# Patient Record
(demographics unavailable — no encounter records)

---

## 2024-10-25 NOTE — HISTORY OF PRESENT ILLNESS
[de-identified] : Mr. 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  \par  63 year old man with no significant medical history started to feel dyspnea on exertion and palpations in 8/2016.  He went to [facility] for evaluation and labs showed wbc count of 197 with 60% blasts, Hgb 6.7 and plt 41.  He was transfused and transferred to [facility] for further treatment.  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  FISH positive for BCR/ABL1 in 92% cells and cytogenetics showed abnormal male karyotype showed t(7:16) and t(9;22).\par  He was started on CALGB 99632 protocol with upfront Dasatinib 140 mg and Dexamethasone induction.  CSF sampling on 9/16/16 was negative for malignant cells and IT MTX given.  Pt was seen by psych during induction due to anxiety associated new diagnosis.  \par  Day 15 (course 1) BM biopsy on 9/15/16 showed 0.5% leukemic cells.  FISH was pos for  BCR-ABL 57% of cells and PCR bcr/abl was negative.  CyG c/w 46 XY t(9;22) in 3 cells.  Dasatinib 140 mg held on 9/22/16 due to cytopenias and restarted at 100 mg (course II) on 10/4/16 upon count recovery.  \par  Had BM biopsy done on 10/27/16 which showed morphologic remission with normal karyotype.  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 ...brothers are haplo\par  \par  \par   [de-identified] : Final Diagnosis\par  1, 2. Bone marrow biopsy and bone marrow aspirate\par  -B-lymphoblastic leukemia/lymphoma.\par  \par  See note and description.\par  \par  Diagnostic note:\par  Suggest correlation with clinical and cytogenetic findings.\par  \par  Comprehensive report with results of pending ancillary studies to follow.\par  \par  Ancillary studies\par  Flow cytometry of bone marrow aspirate shows lymphoblasts (84% of cells), positive for HLA-DR, CD38, , CD34, CD19, CD10, CD22, partial CD20, CD13, CD33, CD11b (minimal); negative , CD15.\par  FISH: Positive for BCR-ABL1 rearrangement in 94.5% of cells (peripheral blood).\par  \par  Microscopic description:\par  1. Biopsy: Sections of clot and biopsy show hypercellularity (95%) with diffuse immature infiltrate, diminished normal trilineage hematopoiesis, rare megakaryocytes, and presence of iron stores.\par  2. Aspirate: Particulate and cellular aspirate smear with predominantly blasts (73%), scattered lymphocytes, rare mature myeloid cells, and nearly absent erythroid cells and megakaryocytes.\par  \par  Bone Marrow Aspirate Differential: (200 Cells).\par  Type % Normal*\par  Blast 73% 0-3\par  Neutrophil and\par  Precursors 4% 47-74\par  Eosinophil 0% 1-3\par  Basophil 0% 0-1\par  Pronormoblast 0% 0-2\par  Normoblast 0% 15-25\par  Monocyte 0% 0-1\par  Lymphocyte 23% 5-15\par  Plasma cell 0% 0-2\par  *Adult Range\par  \par  Comment\par  Iron stain (examined to evaluate for iron stores; see microscopic description) and Giemsa stain (shows appropriate staining pattern) are performed and evaluated on on block(s): 1A, B. \par   [de-identified] : Presenting today for follow up appointment with his sister, waiting outside. He notes weight is up and feeling overall well. . He c/o improved  LE stiffness. Denies fever/chills, night sweats, mouth sores, eye dryness, blurred vision, nausea, vomiting...Some  diarrhea with taper of fk. Now resolvedNo CP, SOB or LE edema. Remains compliant with medications. He is off  Prednisone , Off  Tacrolimus  0.5 mg qd....on jakafi... Acyclovir 400 mg , Zolpidem, and Remeron. He is now on Bactrim mwf (switched from Mepron). He saw Dr Judd in the past..remains active...biking  10.22.24:  Patient is here for a follow up visit. Denies fever, chills, nausea, vomiting, diarrhea, rash, mouth sores or any signs of active bleeding. Denies SOB, chest pain or B/L LE edema. He is on Jakafi 10mg bid, Acyclovir and Bactrim. He is biking daily and remains active. His blood pressure is elevated today.

## 2024-10-25 NOTE — PHYSICAL EXAM
[Restricted in physically strenuous activity but ambulatory and able to carry out work of a light or sedentary nature] : Status 1- Restricted in physically strenuous activity but ambulatory and able to carry out work of a light or sedentary nature, e.g., light house work, office work [Thin] : thin [Fully active, able to carry on all pre-disease performance without restriction] : Status 0 - Fully active, able to carry on all pre-disease performance without restriction [Normal] : full range of motion and no deformities appreciated [Ulcers] : no ulcers [Mucositis] : no mucositis [Thrush] : no thrush [Vesicles] : no vesicles [de-identified] : hairy leukoplakia improved [de-identified] : stiffness and mild contracture of hands and ankles improved [de-identified] : dry ..hyperpigmented

## 2024-10-25 NOTE — REASON FOR VISIT
[Follow-Up Visit] : a follow-up visit for [Acute Lymphoblastic Leukemia] : acute lymphoblastic leukemia [Family Member] : family member [FreeTextEntry2] : s/p MUD (33 y/o female) on 4/21/17..with flu/james/atg preparative regimen

## 2024-10-25 NOTE — PHYSICAL EXAM
[Restricted in physically strenuous activity but ambulatory and able to carry out work of a light or sedentary nature] : Status 1- Restricted in physically strenuous activity but ambulatory and able to carry out work of a light or sedentary nature, e.g., light house work, office work [Thin] : thin [Fully active, able to carry on all pre-disease performance without restriction] : Status 0 - Fully active, able to carry on all pre-disease performance without restriction [Normal] : full range of motion and no deformities appreciated [Ulcers] : no ulcers [Mucositis] : no mucositis [Thrush] : no thrush [Vesicles] : no vesicles [de-identified] : hairy leukoplakia improved [de-identified] : stiffness and mild contracture of hands and ankles improved [de-identified] : dry ..hyperpigmented

## 2024-10-25 NOTE — HISTORY OF PRESENT ILLNESS
[de-identified] : Mr. 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  \par  63 year old man with no significant medical history started to feel dyspnea on exertion and palpations in 8/2016.  He went to [facility] for evaluation and labs showed wbc count of 197 with 60% blasts, Hgb 6.7 and plt 41.  He was transfused and transferred to [facility] for further treatment.  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  FISH positive for BCR/ABL1 in 92% cells and cytogenetics showed abnormal male karyotype showed t(7:16) and t(9;22).\par  He was started on CALGB 28235 protocol with upfront Dasatinib 140 mg and Dexamethasone induction.  CSF sampling on 9/16/16 was negative for malignant cells and IT MTX given.  Pt was seen by psych during induction due to anxiety associated new diagnosis.  \par  Day 15 (course 1) BM biopsy on 9/15/16 showed 0.5% leukemic cells.  FISH was pos for  BCR-ABL 57% of cells and PCR bcr/abl was negative.  CyG c/w 46 XY t(9;22) in 3 cells.  Dasatinib 140 mg held on 9/22/16 due to cytopenias and restarted at 100 mg (course II) on 10/4/16 upon count recovery.  \par  Had BM biopsy done on 10/27/16 which showed morphologic remission with normal karyotype.  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 ...brothers are haplo\par  \par  \par   [de-identified] : Final Diagnosis\par  1, 2. Bone marrow biopsy and bone marrow aspirate\par  -B-lymphoblastic leukemia/lymphoma.\par  \par  See note and description.\par  \par  Diagnostic note:\par  Suggest correlation with clinical and cytogenetic findings.\par  \par  Comprehensive report with results of pending ancillary studies to follow.\par  \par  Ancillary studies\par  Flow cytometry of bone marrow aspirate shows lymphoblasts (84% of cells), positive for HLA-DR, CD38, , CD34, CD19, CD10, CD22, partial CD20, CD13, CD33, CD11b (minimal); negative , CD15.\par  FISH: Positive for BCR-ABL1 rearrangement in 94.5% of cells (peripheral blood).\par  \par  Microscopic description:\par  1. Biopsy: Sections of clot and biopsy show hypercellularity (95%) with diffuse immature infiltrate, diminished normal trilineage hematopoiesis, rare megakaryocytes, and presence of iron stores.\par  2. Aspirate: Particulate and cellular aspirate smear with predominantly blasts (73%), scattered lymphocytes, rare mature myeloid cells, and nearly absent erythroid cells and megakaryocytes.\par  \par  Bone Marrow Aspirate Differential: (200 Cells).\par  Type % Normal*\par  Blast 73% 0-3\par  Neutrophil and\par  Precursors 4% 47-74\par  Eosinophil 0% 1-3\par  Basophil 0% 0-1\par  Pronormoblast 0% 0-2\par  Normoblast 0% 15-25\par  Monocyte 0% 0-1\par  Lymphocyte 23% 5-15\par  Plasma cell 0% 0-2\par  *Adult Range\par  \par  Comment\par  Iron stain (examined to evaluate for iron stores; see microscopic description) and Giemsa stain (shows appropriate staining pattern) are performed and evaluated on on block(s): 1A, B. \par   [de-identified] : Presenting today for follow up appointment with his sister, waiting outside. He notes weight is up and feeling overall well. . He c/o improved  LE stiffness. Denies fever/chills, night sweats, mouth sores, eye dryness, blurred vision, nausea, vomiting...Some  diarrhea with taper of fk. Now resolvedNo CP, SOB or LE edema. Remains compliant with medications. He is off  Prednisone , Off  Tacrolimus  0.5 mg qd....on jakafi... Acyclovir 400 mg , Zolpidem, and Remeron. He is now on Bactrim mwf (switched from Mepron). He saw Dr Judd in the past..remains active...biking  10.22.24:  Patient is here for a follow up visit. Denies fever, chills, nausea, vomiting, diarrhea, rash, mouth sores or any signs of active bleeding. Denies SOB, chest pain or B/L LE edema. He is on Jakafi 10mg bid, Acyclovir and Bactrim. He is biking daily and remains active. His blood pressure is elevated today.

## 2024-10-25 NOTE — REVIEW OF SYSTEMS
[Joint Stiffness] : joint stiffness [Negative] : Allergic/Immunologic [Recent Change In Weight] : ~T no recent weight change [FreeTextEntry2] :  weight up [FreeTextEntry4] : white tongue coating [FreeTextEntry7] : occ loose [FreeTextEntry9] : improved

## 2024-10-25 NOTE — ASSESSMENT
[FreeTextEntry1] : This is a 68 y/o male with no significant medical history was diagnosed with Ph+ B-ALL being treated per CALGB 55431. Was on Dasatinib 100 mg daily; on Course II CALGB 82351.   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 Immunosuppression withdrawn b/c of falling chimerism...developed acute gvhd requiring re institution of tacro reports now further weight loss,  less joint stiffness and more loose bowel. jakafi started....improved further  Peripheral blood work stable and reviewed with patient.  FISH for Y = 100% Counts: WNL's Labs sent for CMP, LDH, Mg,  Whole Blood, CMV-PCR. Restarted Prednisone 9mg QD on 1/15, will continue taper...to off Decreased  Tacrolimus 0.5 mg qd- patient instructed to monitor for signs of acute and chronic GVHD (rash, mouth sores etc)  Had moderate extensive chronic GVHD. Improving  Decrease Acyclovir to 400 mg BID as of 5/23/19.  Discussed discontinuing anti-infectives post discontinuing Tacrolimus for 6 months. Will switch mepron to Bactrim QD once patient finishes current supply. to also cover encapsulated organisms. Patient is now on Bactrim QD mwf...follow creatinine Decrease Mg Ox to 400 mg BID.  Continue other medications as prescribed.  Recommend continued use of Decadron rinse BIDprn.  Recommend Imodium for loose stools prn.  Discussed starting Sprycel  once off Tacrolimus. Not necessary at this point..Also discussed Jakafi  for gvhd if sx not improving and worsening with taper of fk..on  jakafi 5 mg bid..side effects discussed...target 10 mg, increased last year with further improvement Ordering PFTs.  Will give patient Rx for PT, . for stretching and mobility Follow-up with ophthalmologist Beatriz Rubio MD.   No acute GVHD at present.... chronic mild  extensive GVHD... joints ftt, ..score 1...will follow closely. Seen by Dr Judd well care and cancer screening stressed...stressed need for colonoscopy..pt to arrange pt declined flu shot....discussed importance Will cont jakafi... discussed tapering Jakafi in approx 1 year Prescribed hydrochlorothiazide for his blood pressure... crucial to take tonight and tomorrow morning because he is going to get on a plane to [place] (as of 10/22/24)..repeat BP after medication was 170/60..advised to f/u with PMD asap pt is living  in [place]  Follow up with Dr. Yuan in 1 year

## 2024-10-25 NOTE — ADDENDUM
[FreeTextEntry1] : Documented by Carmenza Potter acting as a scribe for Dr. Shameka Yuan on 10/22/24. All medical record entries made by the Scribe were at my, Dr. Shameka Yuan, direction and personally dictated by me on 10/22/24. I have reviewed the chart and agree that the record accurately reflects my personal performance of the history, physical exam, assessment and plan. I have also personally directed, reviewed, and agree with the discharge instructions.

## 2024-10-25 NOTE — ASSESSMENT
[FreeTextEntry1] : This is a 66 y/o male with no significant medical history was diagnosed with Ph+ B-ALL being treated per CALGB 51558. Was on Dasatinib 100 mg daily; on Course II CALGB 16333.   When new CNS disease was found....omaya placed for further treatment....cns now negative....s/p re  induction with pneumonitis......improved..on Dasatinib Prelim search with several  potential 10/10 donors..will move forward with 12/12 female donor..PBSC product S/p IT with resolution of cns disease...also s/p re induction with CLAG...MICHAELLE...and pneumonitis possible due to arac...now s/p MUD (31 y/o female) on 4/21/17..with flu/james/atg preparative regimen..hospital course complicated by cdiff diarrhea..demonstrated engraftment on 5/2/17..d/c'ed in stable condition on 5/9/17. Immunosuppression withdrawn b/c of falling chimerism...developed acute gvhd requiring re institution of tacro reports now further weight loss,  less joint stiffness and more loose bowel. jakafi started....improved further  Peripheral blood work stable and reviewed with patient.  FISH for Y = 100% Counts: WNL's Labs sent for CMP, LDH, Mg,  Whole Blood, CMV-PCR. Restarted Prednisone 9mg QD on 1/15, will continue taper...to off Decreased  Tacrolimus 0.5 mg qd- patient instructed to monitor for signs of acute and chronic GVHD (rash, mouth sores etc)  Had moderate extensive chronic GVHD. Improving  Decrease Acyclovir to 400 mg BID as of 5/23/19.  Discussed discontinuing anti-infectives post discontinuing Tacrolimus for 6 months. Will switch mepron to Bactrim QD once patient finishes current supply. to also cover encapsulated organisms. Patient is now on Bactrim QD mwf...follow creatinine Decrease Mg Ox to 400 mg BID.  Continue other medications as prescribed.  Recommend continued use of Decadron rinse BIDprn.  Recommend Imodium for loose stools prn.  Discussed starting Sprycel  once off Tacrolimus. Not necessary at this point..Also discussed Jakafi  for gvhd if sx not improving and worsening with taper of fk..on  jakafi 5 mg bid..side effects discussed...target 10 mg, increased last year with further improvement Ordering PFTs.  Will give patient Rx for PT, . for stretching and mobility Follow-up with ophthalmologist Beatriz Rubio MD.   No acute GVHD at present.... chronic mild  extensive GVHD... joints ftt, ..score 1...will follow closely. Seen by Dr Judd well care and cancer screening stressed...stressed need for colonoscopy..pt to arrange pt declined flu shot....discussed importance Will cont jakafi... discussed tapering Jakafi in approx 1 year Prescribed hydrochlorothiazide for his blood pressure... crucial to take tonight and tomorrow morning because he is going to get on a plane to [place] (as of 10/22/24)..repeat BP after medication was 170/60..advised to f/u with PMD asap pt is living  in [place]  Follow up with Dr. Yuan in 1 year